# Patient Record
Sex: MALE | Race: WHITE | NOT HISPANIC OR LATINO | Employment: FULL TIME | URBAN - METROPOLITAN AREA
[De-identification: names, ages, dates, MRNs, and addresses within clinical notes are randomized per-mention and may not be internally consistent; named-entity substitution may affect disease eponyms.]

---

## 2017-11-15 ENCOUNTER — TELEPHONE (OUTPATIENT)
Dept: FAMILY MEDICINE CLINIC | Facility: CLINIC | Age: 47
End: 2017-11-15

## 2017-11-15 RX ORDER — SILDENAFIL 100 MG/1
100 TABLET, FILM COATED ORAL DAILY PRN
Qty: 10 TABLET | Refills: 3 | Status: SHIPPED | OUTPATIENT
Start: 2017-11-15 | End: 2019-11-08

## 2017-11-15 NOTE — TELEPHONE ENCOUNTER
PT WOULD LIKE AN RX FOR VIAGRA CALLED IN TO HIS PHARMACY HE WAS GETTING SAMPLES     W\[facility]

## 2017-11-15 NOTE — TELEPHONE ENCOUNTER
Dr matamoros saw this pt for ED and gave him samples but it has been over a year, its in the abstraction. bti did not give samples or see pt for this

## 2018-01-11 ENCOUNTER — OFFICE VISIT (OUTPATIENT)
Dept: FAMILY MEDICINE CLINIC | Facility: CLINIC | Age: 48
End: 2018-01-11

## 2018-01-11 VITALS
SYSTOLIC BLOOD PRESSURE: 106 MMHG | RESPIRATION RATE: 16 BRPM | BODY MASS INDEX: 23.05 KG/M2 | HEART RATE: 97 BPM | WEIGHT: 161 LBS | DIASTOLIC BLOOD PRESSURE: 62 MMHG | OXYGEN SATURATION: 96 % | TEMPERATURE: 98.9 F | HEIGHT: 70 IN

## 2018-01-11 DIAGNOSIS — R68.89 FLU-LIKE SYMPTOMS: Primary | ICD-10-CM

## 2018-01-11 LAB
FLUAV AG NPH QL: NEGATIVE
FLUBV AG NPH QL IA: NEGATIVE

## 2018-01-11 PROCEDURE — 87804 INFLUENZA ASSAY W/OPTIC: CPT | Performed by: INTERNAL MEDICINE

## 2018-01-11 PROCEDURE — 99213 OFFICE O/P EST LOW 20 MIN: CPT | Performed by: INTERNAL MEDICINE

## 2018-01-11 RX ORDER — AMOXICILLIN 875 MG/1
875 TABLET, COATED ORAL EVERY 12 HOURS SCHEDULED
Qty: 20 TABLET | Refills: 0 | Status: SHIPPED | OUTPATIENT
Start: 2018-01-11 | End: 2019-11-08

## 2018-01-11 RX ORDER — OSELTAMIVIR PHOSPHATE 75 MG/1
75 CAPSULE ORAL DAILY
Qty: 10 CAPSULE | Refills: 0 | Status: SHIPPED | OUTPATIENT
Start: 2018-01-11 | End: 2019-11-08

## 2018-01-11 NOTE — PROGRESS NOTES
Subjective   Jeferson Douglas is a 47 y.o. male.     History of Present Illness   Patient was seen today for flulike symptoms.  Flu test was negative.  Patient was in the hospital LDL in today with his wife and was exposed to the flu.  Patient was given Tamiflu and Amoxil and was asked to take the Tamiflu day for 10 days.  He is to start the amoxicillin is running fevers and has a productive cough.    Much of this encounter note is an electronic transcription/translation of spoken language to printed text.  The electronic translation of spoken language may permit erroneous, or at times, nonsensical words or phrases to be inadvertently transcribed.  Although I have reviewed the note for such errors, some may still exist.  The following portions of the patient's history were reviewed and updated as appropriate: allergies, current medications, past family history, past medical history, past social history, past surgical history and problem list.    Review of Systems   Constitutional: Negative for fatigue and fever.   HENT: Positive for congestion, rhinorrhea and sinus pressure. Negative for trouble swallowing.    Eyes: Negative for discharge and visual disturbance.   Respiratory: Negative for choking and shortness of breath.    Cardiovascular: Negative for chest pain and palpitations.   Gastrointestinal: Negative for abdominal pain and blood in stool.   Endocrine: Negative.    Genitourinary: Negative for genital sores and hematuria.   Musculoskeletal: Negative for gait problem and joint swelling.   Skin: Negative for color change, pallor, rash and wound.   Allergic/Immunologic: Positive for environmental allergies. Negative for immunocompromised state.   Neurological: Negative for facial asymmetry and speech difficulty.   Psychiatric/Behavioral: Negative for hallucinations and suicidal ideas.       Objective   Physical Exam   Constitutional: He is oriented to person, place, and time. He appears well-developed and  well-nourished.   HENT:   Head: Normocephalic.   Eyes: Conjunctivae are normal. Pupils are equal, round, and reactive to light.   Neck: Normal range of motion. Neck supple.   Cardiovascular: Normal rate, regular rhythm and normal heart sounds.    Pulmonary/Chest: Effort normal and breath sounds normal. No respiratory distress. He has no wheezes. He has no rales. He exhibits no tenderness.   Abdominal: Soft. Bowel sounds are normal.   Musculoskeletal: Normal range of motion.   Neurological: He is alert and oriented to person, place, and time.   Skin: Skin is warm and dry.   Psychiatric: He has a normal mood and affect. His behavior is normal. Judgment and thought content normal.   Nursing note and vitals reviewed.      Assessment/Plan   Problems Addressed this Visit     None      Visit Diagnoses     Flu-like symptoms    -  Primary    Relevant Orders    Influenza Antigen, Rapid - Swab, Nasopharynx (Completed)

## 2019-11-08 ENCOUNTER — OFFICE VISIT (OUTPATIENT)
Dept: FAMILY MEDICINE CLINIC | Facility: CLINIC | Age: 49
End: 2019-11-08

## 2019-11-08 VITALS
SYSTOLIC BLOOD PRESSURE: 146 MMHG | WEIGHT: 158.2 LBS | HEART RATE: 80 BPM | DIASTOLIC BLOOD PRESSURE: 84 MMHG | TEMPERATURE: 98 F | OXYGEN SATURATION: 99 % | BODY MASS INDEX: 22.65 KG/M2 | HEIGHT: 70 IN

## 2019-11-08 DIAGNOSIS — Z13.220 LIPID SCREENING: ICD-10-CM

## 2019-11-08 DIAGNOSIS — K42.9 UMBILICAL HERNIA WITHOUT OBSTRUCTION AND WITHOUT GANGRENE: ICD-10-CM

## 2019-11-08 DIAGNOSIS — N40.0 ENLARGED PROSTATE: ICD-10-CM

## 2019-11-08 DIAGNOSIS — Z12.5 ENCOUNTER FOR PROSTATE CANCER SCREENING: ICD-10-CM

## 2019-11-08 DIAGNOSIS — Z12.11 ENCOUNTER FOR COLORECTAL CANCER SCREENING: ICD-10-CM

## 2019-11-08 DIAGNOSIS — Z12.12 ENCOUNTER FOR COLORECTAL CANCER SCREENING: ICD-10-CM

## 2019-11-08 DIAGNOSIS — Z00.01 ENCOUNTER FOR ANNUAL GENERAL MEDICAL EXAMINATION WITH ABNORMAL FINDINGS IN ADULT: Primary | ICD-10-CM

## 2019-11-08 DIAGNOSIS — L98.9 SKIN LESION OF FACE: ICD-10-CM

## 2019-11-08 LAB
ALBUMIN SERPL-MCNC: 4.2 G/DL (ref 3.5–5.2)
ALBUMIN/GLOB SERPL: 1.5 G/DL
ALP SERPL-CCNC: 47 U/L (ref 39–117)
ALT SERPL W P-5'-P-CCNC: 22 U/L (ref 1–41)
ANION GAP SERPL CALCULATED.3IONS-SCNC: 11.5 MMOL/L (ref 5–15)
AST SERPL-CCNC: 22 U/L (ref 1–40)
BACTERIA UR QL AUTO: NORMAL /HPF
BILIRUB SERPL-MCNC: 1 MG/DL (ref 0.2–1.2)
BILIRUB UR QL STRIP: NEGATIVE
BUN BLD-MCNC: 17 MG/DL (ref 6–20)
BUN/CREAT SERPL: 16.3 (ref 7–25)
CALCIUM SPEC-SCNC: 8.9 MG/DL (ref 8.6–10.5)
CHLORIDE SERPL-SCNC: 101 MMOL/L (ref 98–107)
CHOLEST SERPL-MCNC: 190 MG/DL (ref 0–200)
CLARITY UR: CLEAR
CO2 SERPL-SCNC: 26.5 MMOL/L (ref 22–29)
COLOR UR: YELLOW
CREAT BLD-MCNC: 1.04 MG/DL (ref 0.76–1.27)
ERYTHROCYTE [DISTWIDTH] IN BLOOD BY AUTOMATED COUNT: 13.4 % (ref 12.3–15.4)
GFR SERPL CREATININE-BSD FRML MDRD: 76 ML/MIN/1.73
GLOBULIN UR ELPH-MCNC: 2.8 GM/DL
GLUCOSE BLD-MCNC: 113 MG/DL (ref 65–99)
GLUCOSE UR STRIP-MCNC: NEGATIVE MG/DL
HCT VFR BLD AUTO: 46.5 % (ref 37.5–51)
HDLC SERPL-MCNC: 87 MG/DL (ref 40–60)
HGB BLD-MCNC: 15.5 G/DL (ref 13–17.7)
HGB UR QL STRIP.AUTO: NEGATIVE
KETONES UR QL STRIP: ABNORMAL
LDLC SERPL CALC-MCNC: 83 MG/DL (ref 0–100)
LDLC/HDLC SERPL: 0.96 {RATIO}
LEUKOCYTE ESTERASE UR QL STRIP.AUTO: NEGATIVE
LYMPHOCYTES # BLD AUTO: 1.8 10*3/MM3 (ref 0.7–3.1)
LYMPHOCYTES NFR BLD AUTO: 32.3 % (ref 19.6–45.3)
MCH RBC QN AUTO: 30.9 PG (ref 26.6–33)
MCHC RBC AUTO-ENTMCNC: 33.4 G/DL (ref 31.5–35.7)
MCV RBC AUTO: 92.6 FL (ref 79–97)
MONOCYTES # BLD AUTO: 0.4 10*3/MM3 (ref 0.1–0.9)
MONOCYTES NFR BLD AUTO: 6.7 % (ref 5–12)
NEUTROPHILS # BLD AUTO: 3.5 10*3/MM3 (ref 1.7–7)
NEUTROPHILS NFR BLD AUTO: 61 % (ref 42.7–76)
NITRITE UR QL STRIP: NEGATIVE
PH UR STRIP.AUTO: 5.5 [PH] (ref 4.6–8)
PLATELET # BLD AUTO: 316 10*3/MM3 (ref 140–450)
PMV BLD AUTO: 7.4 FL (ref 6–12)
POTASSIUM BLD-SCNC: 4.2 MMOL/L (ref 3.5–5.2)
PROT SERPL-MCNC: 7 G/DL (ref 6–8.5)
PROT UR QL STRIP: NEGATIVE
PSA SERPL-MCNC: 1.2 NG/ML (ref 0–4)
RBC # BLD AUTO: 5.02 10*6/MM3 (ref 4.14–5.8)
RBC # UR: NORMAL /HPF
REF LAB TEST METHOD: NORMAL
SODIUM BLD-SCNC: 139 MMOL/L (ref 136–145)
SP GR UR STRIP: 1.02 (ref 1–1.03)
SQUAMOUS #/AREA URNS HPF: NORMAL /HPF
TRIGL SERPL-MCNC: 99 MG/DL (ref 0–150)
UROBILINOGEN UR QL STRIP: ABNORMAL
VLDLC SERPL-MCNC: 19.8 MG/DL (ref 5–40)
WBC NRBC COR # BLD: 5.7 10*3/MM3 (ref 3.4–10.8)
WBC UR QL AUTO: NORMAL /HPF

## 2019-11-08 PROCEDURE — 99396 PREV VISIT EST AGE 40-64: CPT | Performed by: INTERNAL MEDICINE

## 2019-11-08 PROCEDURE — 80061 LIPID PANEL: CPT | Performed by: INTERNAL MEDICINE

## 2019-11-08 PROCEDURE — 81001 URINALYSIS AUTO W/SCOPE: CPT | Performed by: INTERNAL MEDICINE

## 2019-11-08 PROCEDURE — 80053 COMPREHEN METABOLIC PANEL: CPT | Performed by: INTERNAL MEDICINE

## 2019-11-08 PROCEDURE — 85025 COMPLETE CBC W/AUTO DIFF WBC: CPT | Performed by: INTERNAL MEDICINE

## 2019-11-08 PROCEDURE — 99213 OFFICE O/P EST LOW 20 MIN: CPT | Performed by: INTERNAL MEDICINE

## 2019-11-08 PROCEDURE — G0103 PSA SCREENING: HCPCS | Performed by: INTERNAL MEDICINE

## 2019-11-08 NOTE — PROGRESS NOTES
Subjective   Jeferson Douglas is a 49 y.o. male.  Patient with encounter for annual adult general medical evaluation no particular problems with patient does have known umbilical hernia which is not given much trouble she does start prostate screening  Body mass index is 22.7 kg/m².  History of Present Illness   As above annual adult general medical evaluation does have known small umbilical hernia which does not give him problems would like that checked send some discoloration below his right eye which he wishes to have looked at needs prostate screening starting since he just turned 49 possible need to do colorectal screening he will think about that but probably will get it done this year.  Let us know no blood pressure issues did answer some questions about ED medication costs mainly asked that he check with wishes for his low risk because he had been on Viagra in the past.  Patient without any prostate cancer in the family history no contributory family history.  Former smoker quit in 2013 no drug allergies  Review of Systems   All other systems reviewed and are negative.      Objective   Vitals:    11/08/19 0829   BP: 130/86   Pulse: 80   Temp: 98 °F (36.7 °C)   SpO2: 99%   Weight: 71.8 kg (158 lb 3.2 oz)     Physical Exam   Constitutional: He appears well-developed and well-nourished.   HENT:   Head: Normocephalic and atraumatic.   Eyes: Conjunctivae are normal. Pupils are equal, round, and reactive to light.   Neck:   No carotid bruits   Cardiovascular: Normal rate, regular rhythm and normal heart sounds.   Pulmonary/Chest: Effort normal and breath sounds normal.   Abdominal: Soft. Bowel sounds are normal.   Smallish umbilical hernia right at [employer].  Not perceive any incarceration risk.  Does not bulge soft no pain or masses noted.  Currently observe or see surgeon for this.   do not sense any urgency regarding possible surgical repair   Genitourinary:   Genitourinary Comments: Penis within normal limits  scrotum is without masses.  Mild atrophic left testis but had a undescended left testis require surgery as a small child.  Did do digital rectal exam which shows prostate to be 3-4+ with a nodular prominence left prostate lobe we are going to refer him to urology for this.  He will check with family soon family issues and get back with us early next week.   Neurological: He is alert.   Skin: Skin is warm and dry.       No results found for: INR    Procedures    Assessment/Plan   1.  Encounter for annual adult general medical evaluation    2.  Encounter for prostate screening weight pending lab    3.  Enlarged prostate urology referral    4.  Umbilical hernia small observation now patient can see general surgery feels the need for surgeon having pain.    5.  Lipid screening    6.  Skin lesion below right eye increased pigmentation some dermatologist of choice we did give a list of dermatologist for him.    Encounter for colorectal screening patient tends to get it done this year and will get back with us.      Much of this encounter note is an electronic transcription/translation of spoken language to printed text.  The electronic translation of spoken language may permit erroneous, or at times, nonsensical words or phrases to be inadvertently transcribed.  Although I have reviewed the note for such errors, some may still exist. If there are questions or for further clarification, please contact me.

## 2019-11-12 ENCOUNTER — TELEPHONE (OUTPATIENT)
Dept: FAMILY MEDICINE CLINIC | Facility: CLINIC | Age: 49
End: 2019-11-12

## 2019-11-12 NOTE — TELEPHONE ENCOUNTER
Confirm patient has been on this in the past.  If so try to get the generic Viagra 50 mg presuming is really want to improve.  Actually should go through is on milligrams with a half-whole tablet daily as needed please ascertain he is willing to accept the generic and not the brand name version Viagra..  Make sure he going to follow-up with urology also

## 2019-11-12 NOTE — TELEPHONE ENCOUNTER
Patient informed we have not sent a rx over for this medication to get a PA.  So he wants it sent over for 50mg Viagra.h

## 2019-11-13 DIAGNOSIS — N52.9 ERECTILE DYSFUNCTION, UNSPECIFIED ERECTILE DYSFUNCTION TYPE: Primary | ICD-10-CM

## 2019-11-13 RX ORDER — SILDENAFIL 50 MG/1
50 TABLET, FILM COATED ORAL DAILY PRN
Qty: 6 TABLET | Refills: 5 | Status: SHIPPED | OUTPATIENT
Start: 2019-11-13 | End: 2019-11-26 | Stop reason: SDUPTHER

## 2019-11-13 NOTE — TELEPHONE ENCOUNTER
LMVM in regards to message and appt needed for Urology for ED.  Sent in Sildenafil to pharmacy.[facility]

## 2019-11-19 ENCOUNTER — TELEPHONE (OUTPATIENT)
Dept: FAMILY MEDICINE CLINIC | Facility: CLINIC | Age: 49
End: 2019-11-19

## 2019-11-26 ENCOUNTER — TELEPHONE (OUTPATIENT)
Dept: FAMILY MEDICINE CLINIC | Facility: CLINIC | Age: 49
End: 2019-11-26

## 2019-11-26 RX ORDER — SILDENAFIL 50 MG/1
50 TABLET, FILM COATED ORAL DAILY PRN
Qty: 6 TABLET | Refills: 5 | Status: SHIPPED | OUTPATIENT
Start: 2019-11-26 | End: 2019-11-26

## 2019-11-26 RX ORDER — SILDENAFIL 50 MG/1
50 TABLET, FILM COATED ORAL DAILY PRN
Qty: 30 TABLET | Refills: 3 | Status: SHIPPED | OUTPATIENT
Start: 2019-11-26 | End: 2019-12-02 | Stop reason: SDUPTHER

## 2019-11-26 NOTE — TELEPHONE ENCOUNTER
PT RECEIVED CALL FROM [facility] ABOUT HIS RX, BUT HE DIDN'T REALIZE THE QUANTITY WAS ONLY 6    ASKED IF DR. CHAHAL COULD CHANGE THE QUANTITY TO 30 AND PRINT THE RX SO HE COULD PICK IT UP AND TAKE IT TO [facility]?    EITHER WAY, HE WANTS TO SPEAK WITH SOMEONE WHEN DR HIDALGOS

## 2019-11-26 NOTE — TELEPHONE ENCOUNTER
PT NEEDS THIS RX CANCELED AT [facility] AND SENT TO THE [facility] IN HIS CHART PLEASE  sildenafil (VIAGRA) 50 MG tablet

## 2019-12-02 RX ORDER — SILDENAFIL 50 MG/1
50 TABLET, FILM COATED ORAL DAILY PRN
Qty: 30 TABLET | Refills: 3 | Status: SHIPPED | OUTPATIENT
Start: 2019-12-02 | End: 2022-05-27 | Stop reason: SDUPTHER

## 2022-05-27 ENCOUNTER — OFFICE VISIT (OUTPATIENT)
Dept: FAMILY MEDICINE CLINIC | Facility: CLINIC | Age: 52
End: 2022-05-27

## 2022-05-27 VITALS
BODY MASS INDEX: 23.77 KG/M2 | WEIGHT: 166 LBS | HEIGHT: 70 IN | HEART RATE: 88 BPM | TEMPERATURE: 98 F | SYSTOLIC BLOOD PRESSURE: 148 MMHG | OXYGEN SATURATION: 99 % | RESPIRATION RATE: 18 BRPM | DIASTOLIC BLOOD PRESSURE: 72 MMHG

## 2022-05-27 DIAGNOSIS — R03.0 ELEVATED BP WITHOUT DIAGNOSIS OF HYPERTENSION: ICD-10-CM

## 2022-05-27 DIAGNOSIS — N52.9 ERECTILE DYSFUNCTION, UNSPECIFIED ERECTILE DYSFUNCTION TYPE: ICD-10-CM

## 2022-05-27 DIAGNOSIS — M54.2 NECK PAIN: Primary | ICD-10-CM

## 2022-05-27 DIAGNOSIS — R42 VERTIGO: ICD-10-CM

## 2022-05-27 PROBLEM — M51.369 BULGING LUMBAR DISC: Status: ACTIVE | Noted: 2022-05-27

## 2022-05-27 PROBLEM — N52.8 OTHER MALE ERECTILE DYSFUNCTION: Status: ACTIVE | Noted: 2022-05-27

## 2022-05-27 PROBLEM — M51.36 BULGING LUMBAR DISC: Status: ACTIVE | Noted: 2022-05-27

## 2022-05-27 PROBLEM — K42.9 UMBILICAL HERNIA WITHOUT OBSTRUCTION AND WITHOUT GANGRENE: Status: ACTIVE | Noted: 2022-05-27

## 2022-05-27 LAB
ALBUMIN SERPL-MCNC: 4.7 G/DL (ref 3.5–5.2)
ALBUMIN/GLOB SERPL: 2.2 G/DL
ALP SERPL-CCNC: 47 U/L (ref 39–117)
ALT SERPL W P-5'-P-CCNC: 19 U/L (ref 1–41)
ANION GAP SERPL CALCULATED.3IONS-SCNC: 9 MMOL/L (ref 5–15)
AST SERPL-CCNC: 18 U/L (ref 1–40)
BILIRUB SERPL-MCNC: 0.7 MG/DL (ref 0–1.2)
BUN SERPL-MCNC: 12 MG/DL (ref 6–20)
BUN/CREAT SERPL: 12.6 (ref 7–25)
CALCIUM SPEC-SCNC: 9.2 MG/DL (ref 8.6–10.5)
CHLORIDE SERPL-SCNC: 103 MMOL/L (ref 98–107)
CHOLEST SERPL-MCNC: 243 MG/DL (ref 0–200)
CO2 SERPL-SCNC: 27 MMOL/L (ref 22–29)
CREAT SERPL-MCNC: 0.95 MG/DL (ref 0.76–1.27)
EGFRCR SERPLBLD CKD-EPI 2021: 96.3 ML/MIN/1.73
ERYTHROCYTE [DISTWIDTH] IN BLOOD BY AUTOMATED COUNT: 13.4 % (ref 12.3–15.4)
GLOBULIN UR ELPH-MCNC: 2.1 GM/DL
GLUCOSE SERPL-MCNC: 115 MG/DL (ref 65–99)
HCT VFR BLD AUTO: 47.3 % (ref 37.5–51)
HDLC SERPL-MCNC: 74 MG/DL (ref 40–60)
HGB BLD-MCNC: 15.6 G/DL (ref 13–17.7)
LDLC SERPL CALC-MCNC: 157 MG/DL (ref 0–100)
LDLC/HDLC SERPL: 2.08 {RATIO}
LYMPHOCYTES # BLD AUTO: 1.4 10*3/MM3 (ref 0.7–3.1)
LYMPHOCYTES NFR BLD AUTO: 28.9 % (ref 19.6–45.3)
MCH RBC QN AUTO: 30.7 PG (ref 26.6–33)
MCHC RBC AUTO-ENTMCNC: 33 G/DL (ref 31.5–35.7)
MCV RBC AUTO: 92.8 FL (ref 79–97)
MONOCYTES # BLD AUTO: 0.4 10*3/MM3 (ref 0.1–0.9)
MONOCYTES NFR BLD AUTO: 8.8 % (ref 5–12)
NEUTROPHILS NFR BLD AUTO: 3 10*3/MM3 (ref 1.7–7)
NEUTROPHILS NFR BLD AUTO: 62.3 % (ref 42.7–76)
PLATELET # BLD AUTO: 324 10*3/MM3 (ref 140–450)
PMV BLD AUTO: 6.9 FL (ref 6–12)
POTASSIUM SERPL-SCNC: 4.7 MMOL/L (ref 3.5–5.2)
PROT SERPL-MCNC: 6.8 G/DL (ref 6–8.5)
RBC # BLD AUTO: 5.09 10*6/MM3 (ref 4.14–5.8)
SODIUM SERPL-SCNC: 139 MMOL/L (ref 136–145)
TRIGL SERPL-MCNC: 74 MG/DL (ref 0–150)
TSH SERPL DL<=0.05 MIU/L-ACNC: 2.54 UIU/ML (ref 0.27–4.2)
VLDLC SERPL-MCNC: 12 MG/DL (ref 5–40)
WBC NRBC COR # BLD: 4.8 10*3/MM3 (ref 3.4–10.8)

## 2022-05-27 PROCEDURE — 80061 LIPID PANEL: CPT

## 2022-05-27 PROCEDURE — 36415 COLL VENOUS BLD VENIPUNCTURE: CPT

## 2022-05-27 PROCEDURE — 80050 GENERAL HEALTH PANEL: CPT

## 2022-05-27 PROCEDURE — 99214 OFFICE O/P EST MOD 30 MIN: CPT

## 2022-05-27 RX ORDER — SILDENAFIL 50 MG/1
50 TABLET, FILM COATED ORAL DAILY PRN
Qty: 30 TABLET | Refills: 0 | Status: SHIPPED | OUTPATIENT
Start: 2022-05-27

## 2022-05-27 RX ORDER — MELOXICAM 7.5 MG/1
7.5 TABLET ORAL DAILY
Qty: 30 TABLET | Refills: 0 | Status: SHIPPED | OUTPATIENT
Start: 2022-05-27 | End: 2022-06-24

## 2022-05-27 RX ORDER — SILDENAFIL 50 MG/1
50 TABLET, FILM COATED ORAL DAILY PRN
Qty: 30 TABLET | Refills: 0 | Status: SHIPPED | OUTPATIENT
Start: 2022-05-27 | End: 2022-05-27 | Stop reason: SDUPTHER

## 2022-05-27 RX ORDER — BACLOFEN 10 MG/1
10 TABLET ORAL 3 TIMES DAILY
Qty: 30 TABLET | Refills: 0 | Status: SHIPPED | OUTPATIENT
Start: 2022-05-27 | End: 2022-12-02

## 2022-05-27 NOTE — PATIENT INSTRUCTIONS
Our office will call you or you will see a note on your [employer] alfred when your in-office blood work results.  You have been given a prescription for blood pressure cuff today.  Please check blood pressure daily and follow-up with office in 1 week for blood pressure readings.   For neck pain, use heat pack on neck 2-3 times a day for 20-minute intervals.  For the next 3 to 5 days, take meloxicam daily for inflammation and pain.  May take baclofen as needed for muscle spasms 3 times a day; do not take while driving or drinking alcohol.  If not better in about 1 to 2 weeks, you can return for x-rays and we can order physical therapy.  Patient agrees with plan of care and understands instructions. Call if worsening symptoms or any problems or concerns.

## 2022-05-27 NOTE — PROGRESS NOTES
"Chief Complaint  Neck Pain (About a month no known injury) and dizzy episode (Was driving and had dizzy spell and felt bad/ )    Subjective          Jeferson Douglas presents to [facility]  History of Present Illness  This patient is a 51-year-old male, new patient to me, last seen in office 11/8/2019.    Pt reports neck pain for about 1-2 months, nki. States it feels like a strained muscle. Denies N/T in shoulders or arms. Feels pain with pinching pain when moving head up and down. Pt reports its a constant pain that is worse with movement. Denies injury in neck in past. History of lower back issues, bulging disc in lumbar area- relieved with PT and meds. Denies trying ice or heat. States he tried IBU OTC medication for neck pain, with mild temporary relief. States pain goes away when he is sleeping and laying down.    Pt has had a history of room-spinning dizziness and had a history of vertigo. Pt reports he feels like when he is walking, he feels a sensation of unsteadiness/ \"feeling like skipping\". Pt not interested in starting medication today for vertigo.    Pt reports he was seen by a GI doctor for his hiatal hernia in past several years ago.  Patient states he was told to not worry about the hernia unless it causes him issues.  Patient would like it checked out today patient denies abdominal pain and denies abnormal discharge from bellybutton.    Patient has been prescribed to Viagra in the past and has requested a refill today.  Patient in past has been on Viagra 50 mg tablet daily as needed for erectile dysfunction, patient has tolerated this dose in the past.    Objective   Vital Signs:  /72 (BP Location: Right arm, Patient Position: Sitting)   Pulse 88   Temp 98 °F (36.7 °C) (Infrared)   Resp 18   Ht 177.8 cm (70\")   Wt 75.3 kg (166 lb)   SpO2 99%   BMI 23.82 kg/m²   BMI is within normal parameters. No other follow-up for BMI required.      Physical " Exam  Constitutional:       General: He is awake.      Appearance: Normal appearance.   HENT:      Head: Normocephalic and atraumatic.      Nose: Nose normal.   Eyes:      Extraocular Movements: Extraocular movements intact.      Conjunctiva/sclera: Conjunctivae normal.      Pupils: Pupils are equal, round, and reactive to light.   Cardiovascular:      Rate and Rhythm: Normal rate and regular rhythm.      Pulses: Normal pulses.      Heart sounds: Normal heart sounds.   Pulmonary:      Effort: Pulmonary effort is normal.      Breath sounds: Normal breath sounds and air entry.   Musculoskeletal:      Cervical back: Full passive range of motion without pain, normal range of motion and neck supple.   Skin:     General: Skin is warm and dry.   Neurological:      General: No focal deficit present.      Mental Status: He is alert and oriented to person, place, and time. Mental status is at baseline.   Psychiatric:         Attention and Perception: Attention normal.         Behavior: Behavior normal. Behavior is cooperative.        Result Review :                 Assessment and Plan    Diagnoses and all orders for this visit:    1. Neck pain (Primary)  -     CBC & Differential  -     Comprehensive Metabolic Panel  -     Lipid Panel  -     TSH Rfx On Abnormal To Free T4  -     meloxicam (Mobic) 7.5 MG tablet; Take 1 tablet by mouth Daily.  Dispense: 30 tablet; Refill: 0  -     baclofen (LIORESAL) 10 MG tablet; Take 1 tablet by mouth 3 (Three) Times a Day.  Dispense: 30 tablet; Refill: 0    2. Vertigo  -     CBC & Differential  -     Comprehensive Metabolic Panel  -     Lipid Panel  -     TSH Rfx On Abnormal To Free T4    3. Elevated BP without diagnosis of hypertension  -     Miscellaneous DME    4. Erectile dysfunction, unspecified erectile dysfunction type  -     Discontinue: sildenafil (VIAGRA) 50 MG tablet; Take 1 tablet by mouth Daily As Needed for Erectile Dysfunction.  Dispense: 30 tablet; Refill: 0  -     sildenafil  (VIAGRA) 50 MG tablet; Take 1 tablet by mouth Daily As Needed for Erectile Dysfunction.  Dispense: 30 tablet; Refill: 0    Our office will call you or you will see a note on your [employer] alfred when your in-office blood work results.  You have been given a prescription for blood pressure cuff today.  Please check blood pressure daily and follow-up with office in 1 week for blood pressure readings.   For neck pain, use heat pack on neck 2-3 times a day for 20-minute intervals.  For the next 3 to 5 days, take meloxicam daily for inflammation and pain.  May take baclofen as needed for muscle spasms 3 times a day; do not take while driving or drinking alcohol.  If not better in about 1 to 2 weeks, you can return for x-rays and we can order physical therapy.  Patient agrees with plan of care and understands instructions. Call if worsening symptoms or any problems or concerns.            Follow Up   Return in about 1 week (around 6/3/2022) for recheck bp in 1 week and return for 6 month annual physical.  Patient was given instructions and counseling regarding his condition or for health maintenance advice. Please see specific information pulled into the AVS if appropriate.

## 2022-06-24 DIAGNOSIS — M54.2 NECK PAIN: ICD-10-CM

## 2022-06-24 RX ORDER — MELOXICAM 7.5 MG/1
TABLET ORAL
Qty: 30 TABLET | Refills: 0 | Status: SHIPPED | OUTPATIENT
Start: 2022-06-24 | End: 2022-12-02

## 2022-12-02 ENCOUNTER — OFFICE VISIT (OUTPATIENT)
Dept: FAMILY MEDICINE CLINIC | Facility: CLINIC | Age: 52
End: 2022-12-02

## 2022-12-02 VITALS
HEIGHT: 70 IN | HEART RATE: 80 BPM | WEIGHT: 169 LBS | DIASTOLIC BLOOD PRESSURE: 78 MMHG | BODY MASS INDEX: 24.2 KG/M2 | TEMPERATURE: 98.2 F | RESPIRATION RATE: 18 BRPM | OXYGEN SATURATION: 99 % | SYSTOLIC BLOOD PRESSURE: 142 MMHG

## 2022-12-02 DIAGNOSIS — H93.8X3 SENSATION OF FULLNESS IN BOTH EARS: ICD-10-CM

## 2022-12-02 DIAGNOSIS — Z11.59 ENCOUNTER FOR HEPATITIS C SCREENING TEST FOR LOW RISK PATIENT: ICD-10-CM

## 2022-12-02 DIAGNOSIS — Z12.5 SCREENING FOR PROSTATE CANCER: ICD-10-CM

## 2022-12-02 DIAGNOSIS — E78.2 MIXED HYPERLIPIDEMIA: ICD-10-CM

## 2022-12-02 DIAGNOSIS — Z00.00 ANNUAL PHYSICAL EXAM: Primary | ICD-10-CM

## 2022-12-02 DIAGNOSIS — M50.30 DEGENERATIVE DISC DISEASE, CERVICAL: ICD-10-CM

## 2022-12-02 DIAGNOSIS — Z13.228 SCREENING FOR METABOLIC DISORDER: ICD-10-CM

## 2022-12-02 PROBLEM — M43.13 SPONDYLOLISTHESIS OF CERVICOTHORACIC REGION: Status: ACTIVE | Noted: 2022-12-02

## 2022-12-02 PROCEDURE — 99396 PREV VISIT EST AGE 40-64: CPT

## 2022-12-02 PROCEDURE — 99213 OFFICE O/P EST LOW 20 MIN: CPT

## 2022-12-02 RX ORDER — VIT C/B6/B5/MAGNESIUM/HERB 173 50-5-6-5MG
CAPSULE ORAL
COMMUNITY

## 2022-12-02 NOTE — PATIENT INSTRUCTIONS
You are due for routine colon cancer screening.  Please call office and let us know if you would like to have a routine colonoscopy screening or do Cologuard.    Return to office within the next week for fasting labs.    Continue to work on diet and exercise.  Once labs are collected we will recalculate your ASCVD risk score.  If greater than 7 we will start cholesterol medication to prevent future CVD.    Counseling was provided on nutrition, physical activity, development, and preventative health. Patient verbalizes understanding no additional questions were asked. Patient agrees with plan of care and understands instructions. Call if any problems or concerns.

## 2022-12-02 NOTE — PROGRESS NOTES
Chief Complaint  Annual Exam and Neck Pain (F/u - Went to chiropractor after last visit and was told it was arthritis causing the pain.)    Subjective        Jeferson Douglas presents to [facility]  History of Present Illness  Patient is a 52-year-old male last saw me in office 5/27/2022. Patient here today for annual physical with routine labs. Patient is not fasting today. Reports a mixed diet, eats eggs, chicken, fish. Reports that he does exercise regularly. Patient is working is a  for [employer].  Does go to eye doctor annually- Not up to date.  Does have dental insurance- does go to dentist every 6 months, Up to date.   Patient is a non-smoker, use to smoke 1ppd for about 25 years.  Denies family history of colon cancer, diabetes, or prostate cancer.   Colonoscopy: Patient has never had a colonoscopy.  Patient wants to talk to wife before scheduling colonoscopy and will inform our office what he would like to do within the next week.  Last labs completed 5/27/2022, reveals elevated cholesterol panel.  Patient currently not taking any medication for for mixed hyperlipidemia at this time. Patient states he walks a lot at work, uses stationary bike on weekends.  Patient has history of chronic neck pain, goes to chiropractor.  Patient reports he went to a chiropractor last week and told him he has arthritis in his neck.  Patient had cervical x-ray completed on 9/23/2022 that revealed multilevel cervical degenerative disc disease, greatest at C6 and C7 spondylolisthesis with moderately severe left facet joint degeneration.  Copy of x-ray has been provided at today's visit and scanned into chart.  Patient was flying from [place] and was c/o bilateral ear fullness that took several days for them to pop and has continued decreased hearing. Denies having pain.    Objective   Vital Signs:  /78 (BP Location: Left arm, Patient Position: Sitting)   Pulse 80   Temp 98.2 °F (36.8 °C)  "(Infrared)   Resp 18   Ht 177.8 cm (70\")   Wt 76.7 kg (169 lb)   SpO2 99%   BMI 24.25 kg/m²   Estimated body mass index is 24.25 kg/m² as calculated from the following:    Height as of this encounter: 177.8 cm (70\").    Weight as of this encounter: 76.7 kg (169 lb).    BMI is within normal parameters. No other follow-up for BMI required.    The 10-year ASCVD risk score (Leandro SANCHEZ, et al., 2019) is: 4.9%    Values used to calculate the score:      Age: 52 years      Sex: Male      Is Non- : No      Diabetic: No      Tobacco smoker: No      Systolic Blood Pressure: 142 mmHg      Is BP treated: No      HDL Cholesterol: 74 mg/dL      Total Cholesterol: 243 mg/dL      Physical Exam  Constitutional:       General: He is awake.      Appearance: Normal appearance.   HENT:      Head: Normocephalic and atraumatic.      Right Ear: Tympanic membrane and ear canal normal.      Left Ear: Tympanic membrane and ear canal normal.      Nose: Nose normal.      Right Turbinates: Not swollen or pale.      Left Turbinates: Not swollen or pale.   Eyes:      Extraocular Movements: Extraocular movements intact.      Conjunctiva/sclera: Conjunctivae normal.      Pupils: Pupils are equal, round, and reactive to light.   Cardiovascular:      Rate and Rhythm: Normal rate and regular rhythm.      Pulses: Normal pulses.      Heart sounds: Normal heart sounds.   Pulmonary:      Effort: Pulmonary effort is normal.      Breath sounds: Normal breath sounds and air entry.   Skin:     General: Skin is warm and dry.   Neurological:      General: No focal deficit present.      Mental Status: He is alert and oriented to person, place, and time. Mental status is at baseline.   Psychiatric:         Attention and Perception: Attention normal.         Behavior: Behavior normal. Behavior is cooperative.        Result Review :                Assessment and Plan   Diagnoses and all orders for this visit:    1. Annual physical exam " (Primary)  -     CBC & Differential; Future  -     Comprehensive Metabolic Panel; Future  -     Lipid Panel; Future  -     PSA Screen; Future  -     HCV Antibody Rfx To Qnt PCR; Future    2. Mixed hyperlipidemia  -     Comprehensive Metabolic Panel; Future  -     Lipid Panel; Future    3. Screening for metabolic disorder  -     CBC & Differential; Future  -     Comprehensive Metabolic Panel; Future    4. Encounter for hepatitis C screening test for low risk patient  -     HCV Antibody Rfx To Qnt PCR; Future    5. Screening for prostate cancer  -     PSA Screen; Future    6. Degenerative disc disease, cervical    7. Sensation of fullness in both ears    You are due for routine colon cancer screening.  Please call office and let us know if you would like to have a routine colonoscopy screening or do Cologuard.    Return to office within the next week for fasting labs.    Continue to work on diet and exercise.  Once labs are collected we will recalculate your ASCVD risk score.  If greater than 7 we will start cholesterol medication to prevent future CVD.    Counseling was provided on nutrition, physical activity, development, and preventative health. Patient verbalizes understanding no additional questions were asked. Patient agrees with plan of care and understands instructions. Call if any problems or concerns.            Follow Up   Return for lab only apt for fasting labs.  Patient was given instructions and counseling regarding his condition or for health maintenance advice. Please see specific information pulled into the AVS if appropriate.

## 2022-12-09 ENCOUNTER — TELEPHONE (OUTPATIENT)
Dept: FAMILY MEDICINE CLINIC | Facility: CLINIC | Age: 52
End: 2022-12-09

## 2022-12-09 DIAGNOSIS — Z12.11 SCREEN FOR COLON CANCER: Primary | ICD-10-CM

## 2022-12-09 NOTE — TELEPHONE ENCOUNTER
Caller: Jeferson Douglas    Relationship: Self    Best call back number: [phone]    What specialty or service is being requested: PREVENTATIVE COLONOSCOPY PROCEDURE    What is the provider, practice or medical service name: [facility]

## 2023-01-26 ENCOUNTER — PREP FOR SURGERY (OUTPATIENT)
Dept: OTHER | Facility: HOSPITAL | Age: 53
End: 2023-01-26
Payer: COMMERCIAL

## 2023-01-26 DIAGNOSIS — Z12.11 SCREEN FOR COLON CANCER: Primary | ICD-10-CM

## 2023-02-08 PROBLEM — Z12.11 SCREEN FOR COLON CANCER: Status: ACTIVE | Noted: 2023-02-08

## 2023-03-24 ENCOUNTER — ANESTHESIA (OUTPATIENT)
Dept: GASTROENTEROLOGY | Facility: HOSPITAL | Age: 53
End: 2023-03-24
Payer: COMMERCIAL

## 2023-03-24 ENCOUNTER — HOSPITAL ENCOUNTER (OUTPATIENT)
Facility: HOSPITAL | Age: 53
Setting detail: HOSPITAL OUTPATIENT SURGERY
Discharge: HOME | End: 2023-03-24
Attending: SURGERY | Admitting: SURGERY
Payer: COMMERCIAL

## 2023-03-24 ENCOUNTER — ANESTHESIA EVENT (OUTPATIENT)
Dept: GASTROENTEROLOGY | Facility: HOSPITAL | Age: 53
End: 2023-03-24
Payer: COMMERCIAL

## 2023-03-24 VITALS
RESPIRATION RATE: 16 BRPM | WEIGHT: 163 LBS | OXYGEN SATURATION: 97 % | DIASTOLIC BLOOD PRESSURE: 96 MMHG | BODY MASS INDEX: 24.14 KG/M2 | HEART RATE: 68 BPM | SYSTOLIC BLOOD PRESSURE: 125 MMHG | HEIGHT: 69 IN

## 2023-03-24 DIAGNOSIS — Z12.11 SCREEN FOR COLON CANCER: ICD-10-CM

## 2023-03-24 PROCEDURE — S0260 H&P FOR SURGERY: HCPCS | Performed by: SURGERY

## 2023-03-24 PROCEDURE — 45385 COLONOSCOPY W/LESION REMOVAL: CPT | Performed by: SURGERY

## 2023-03-24 PROCEDURE — 25010000002 PROPOFOL 10 MG/ML EMULSION: Performed by: NURSE ANESTHETIST, CERTIFIED REGISTERED

## 2023-03-24 PROCEDURE — 45380 COLONOSCOPY AND BIOPSY: CPT | Performed by: SURGERY

## 2023-03-24 PROCEDURE — 88305 TISSUE EXAM BY PATHOLOGIST: CPT | Performed by: SURGERY

## 2023-03-24 RX ORDER — SODIUM CHLORIDE, SODIUM LACTATE, POTASSIUM CHLORIDE, CALCIUM CHLORIDE 600; 310; 30; 20 MG/100ML; MG/100ML; MG/100ML; MG/100ML
30 INJECTION, SOLUTION INTRAVENOUS CONTINUOUS PRN
Status: DISCONTINUED | OUTPATIENT
Start: 2023-03-24 | End: 2023-03-24 | Stop reason: HOSPADM

## 2023-03-24 RX ORDER — PROPOFOL 10 MG/ML
VIAL (ML) INTRAVENOUS AS NEEDED
Status: DISCONTINUED | OUTPATIENT
Start: 2023-03-24 | End: 2023-03-24 | Stop reason: SURG

## 2023-03-24 RX ORDER — PROPOFOL 10 MG/ML
VIAL (ML) INTRAVENOUS CONTINUOUS PRN
Status: DISCONTINUED | OUTPATIENT
Start: 2023-03-24 | End: 2023-03-24 | Stop reason: SURG

## 2023-03-24 RX ORDER — LIDOCAINE HYDROCHLORIDE 20 MG/ML
INJECTION, SOLUTION INFILTRATION; PERINEURAL AS NEEDED
Status: DISCONTINUED | OUTPATIENT
Start: 2023-03-24 | End: 2023-03-24 | Stop reason: SURG

## 2023-03-24 RX ADMIN — PROPOFOL 180 MCG/KG/MIN: 10 INJECTION, EMULSION INTRAVENOUS at 07:57

## 2023-03-24 RX ADMIN — SODIUM CHLORIDE, POTASSIUM CHLORIDE, SODIUM LACTATE AND CALCIUM CHLORIDE 30 ML/HR: 600; 310; 30; 20 INJECTION, SOLUTION INTRAVENOUS at 07:25

## 2023-03-24 RX ADMIN — LIDOCAINE HYDROCHLORIDE 60 MG: 20 INJECTION, SOLUTION INFILTRATION; PERINEURAL at 07:57

## 2023-03-24 RX ADMIN — Medication 150 MG: at 07:57

## 2023-03-24 NOTE — ANESTHESIA PREPROCEDURE EVALUATION
Anesthesia Evaluation     NPO Solid Status: > 8 hours             Airway   Mallampati: II  TM distance: >3 FB  Neck ROM: full  no difficulty expected  Dental - normal exam     Pulmonary - normal exam   Cardiovascular - normal exam    (+) hyperlipidemia,       Neuro/Psych  (+) dizziness/light headedness,    GI/Hepatic/Renal/Endo      Musculoskeletal     (+) back pain,   Abdominal  - normal exam   Substance History      OB/GYN          Other   arthritis,                      Anesthesia Plan    ASA 2     MAC       Anesthetic plan, risks, benefits, and alternatives have been provided, discussed and informed consent has been obtained with: patient.        CODE STATUS:

## 2023-03-24 NOTE — OP NOTE
Surgeon:     Jacob Mackenzie Jr., M.D.    Preoperative Diagnosis:     1.  Need for screening colonoscopy    Postoperative Diagnosis:     1.  Cecal polyp  2.  Rectal polyp    Procedure Performed:     Colonoscopy with hot snare polypectomy    Indications:     The patient is a pleasant 52-year-old male who presents for screening colonoscopy.  The patient understands the indications, alternatives, risks, and benefits of the procedure and wishes to proceed.    Procedure:     The patient was identified, taken to the endoscopy suite, and place in the left side down decubitus procedure.  The patient underwent a MAC anesthesia and was appropriately monitored through the case by the anesthesia personnel.  A rectal exam was performed that was normal.  The colonoscope was introduced into the rectum and advanced very carefully to the cecum that was identified by the cecal strap, ileocecal valve, and the appendiceal orifice.  The scope was then slowly withdrawn with careful circumferential examination of the mucosa performed.  The bowel prep was good allowing adequate visualization of mucosal surfaces.  The scope was withdrawn.  The patient tolerated the procedure well and was transferred the recovery area in stable condition.    Findings:    There was an elongated sessile 1 cm adenomatous appearing polyp in the cecum that was completely removed by hot snare polypectomy and retrieved.    There was a 4 mm hyperplastic appearing sessile polyp in the rectum that was removed by cold biopsy forceps and retrieved.    Recommendations:     1.  Await pathology results from the polypectomies.  2.  Repeat colonoscopy in 5 years.    Jacob Mackenzie Jr., M.D.

## 2023-03-24 NOTE — DISCHARGE INSTRUCTIONS

## 2023-03-24 NOTE — ANESTHESIA POSTPROCEDURE EVALUATION
"Patient: Jeferson Douglas    Procedure Summary     Date: 03/24/23 Room / Location: [facility] ENDOSCOPY 9 /  [facility]    Anesthesia Start: 0756 Anesthesia Stop: 0821    Procedure: COLONOSCOPY TO CECUM WITH HOT SNARE POLYPECTOMY, COLD POLYPECTOMY Diagnosis:       Screen for colon cancer      (Screen for colon cancer [Z12.11])    Surgeons: Jacob Mackenzie Jr., MD Provider: Chris Velázquez MD    Anesthesia Type: MAC ASA Status: 2          Anesthesia Type: MAC    Vitals  Vitals Value Taken Time   /96 03/24/23 0841   Temp     Pulse 68 03/24/23 0841   Resp 16 03/24/23 0841   SpO2 97 % 03/24/23 0841           Post Anesthesia Care and Evaluation    Patient location during evaluation: bedside  Patient participation: complete - patient participated  Level of consciousness: awake and alert  Pain management: adequate    Airway patency: patent  Anesthetic complications: No anesthetic complications    Cardiovascular status: acceptable  Respiratory status: acceptable  Hydration status: acceptable    Comments: /96 (BP Location: Left arm, Patient Position: Lying)   Pulse 68   Resp 16   Ht 175.3 cm (69\")   Wt 73.9 kg (163 lb)   SpO2 97%   BMI 24.07 kg/m²       "

## 2023-03-24 NOTE — H&P
[facility]   HISTORY AND PHYSICAL    Patient Name: Jeferson Douglas  : 1970  MRN: [id number]  Primary Care Physician:  Jennifer Verdugo APRN  Date of admission: 3/24/2023    Subjective   Subjective     Chief Complaint: Need for screening colonoscopy    History of Present Illness     The patient is a pleasant 52-year-old male who presents for screening colonoscopy.  He has never had a previous colonoscopy.  He has not had any significant GI symptoms.  He has no family history of colon cancer.    Review of Systems   Constitutional: Negative for fatigue and fever.   Respiratory: Negative for chest tightness and shortness of breath.    Cardiovascular: Negative for chest pain and palpitations.   Gastrointestinal: Negative for abdominal pain, blood in stool, constipation, diarrhea, nausea and vomiting.        Personal History     Past Medical History:   Diagnosis Date   • Asthma    • Back pain        Past Surgical History:   Procedure Laterality Date   • HERNIA REPAIR         Family History: family history includes No Known Problems in his mother. Otherwise pertinent FHx was reviewed and not pertinent to current issue.    Social History:  reports that he quit smoking about 10 years ago. His smoking use included cigarettes. He does not have any smokeless tobacco history on file. He reports current alcohol use. He reports that he does not use drugs.    Home Medications:  Turmeric and sildenafil    Allergies:  No Known Allergies    Objective    Objective     Vitals:   Heart Rate:  [81] 81  Resp:  [16] 16  BP: (149)/(101) 149/101    Physical Exam  Constitutional:       Appearance: Normal appearance. He is well-developed. He is not toxic-appearing.   Eyes:      General: No scleral icterus.  Pulmonary:      Effort: Pulmonary effort is normal. No respiratory distress.   Abdominal:      Palpations: Abdomen is soft.      Tenderness: There is no abdominal tenderness.   Skin:     General: Skin is warm and dry.    Neurological:      Mental Status: He is alert and oriented to person, place, and time.   Psychiatric:         Behavior: Behavior normal.         Thought Content: Thought content normal.         Judgment: Judgment normal.           Assessment & Plan   Assessment / Plan     Brief Patient Summary:  Jeferson Douglas is a 52 y.o. male who is in need of a screening colonoscopy.    Active Hospital Problems:  Active Hospital Problems    Diagnosis    • **Screen for colon cancer      Plan: Colonoscopy.  The patient understands the indications, alternatives, risks, and benefits of the procedure and wishes to proceed.      Jacob Mackenzie Jr., MD

## 2023-03-27 PROBLEM — Z12.11 SCREEN FOR COLON CANCER: Status: RESOLVED | Noted: 2023-02-08 | Resolved: 2023-03-27

## 2023-03-27 PROBLEM — Z86.0100 HISTORY OF COLON POLYPS: Status: ACTIVE | Noted: 2023-03-27

## 2023-03-27 PROBLEM — Z86.010 HISTORY OF COLON POLYPS: Status: ACTIVE | Noted: 2023-03-27

## 2023-03-27 PROBLEM — Q79.2 EXOMPHALOS: Status: ACTIVE | Noted: 2022-05-27

## 2023-03-27 LAB
LAB AP CASE REPORT: NORMAL
PATH REPORT.FINAL DX SPEC: NORMAL
PATH REPORT.GROSS SPEC: NORMAL

## 2023-03-29 ENCOUNTER — TELEPHONE (OUTPATIENT)
Dept: SURGERY | Facility: CLINIC | Age: 53
End: 2023-03-29
Payer: COMMERCIAL

## 2023-03-29 NOTE — TELEPHONE ENCOUNTER
Called patient to give his pathology results from his colonoscopy. No answer, LVM with his results.  Advised to call back with any questions. Placed in recall for cscope in 5 years.

## 2023-03-29 NOTE — TELEPHONE ENCOUNTER
----- Message from Jacob Mackenzie Jr., MD sent at 3/29/2023  1:07 PM EDT -----  Please contact this patient to inform that the polyps are benign and a repeat colonoscopy is needed in 5 years.  Please place in recall for a colonoscopy in 5 years.  Thanks.

## 2023-09-12 ENCOUNTER — OFFICE VISIT (OUTPATIENT)
Dept: FAMILY MEDICINE CLINIC | Facility: CLINIC | Age: 53
End: 2023-09-12
Payer: COMMERCIAL

## 2023-09-12 VITALS
WEIGHT: 164 LBS | HEIGHT: 69 IN | DIASTOLIC BLOOD PRESSURE: 84 MMHG | HEART RATE: 100 BPM | SYSTOLIC BLOOD PRESSURE: 144 MMHG | OXYGEN SATURATION: 97 % | BODY MASS INDEX: 24.29 KG/M2 | TEMPERATURE: 98.4 F

## 2023-09-12 DIAGNOSIS — M25.561 ACUTE PAIN OF RIGHT KNEE: Primary | ICD-10-CM

## 2023-09-12 DIAGNOSIS — M43.13 SPONDYLOLISTHESIS OF CERVICOTHORACIC REGION: ICD-10-CM

## 2023-09-12 DIAGNOSIS — E78.2 MIXED HYPERLIPIDEMIA: ICD-10-CM

## 2023-09-12 DIAGNOSIS — R03.0 ELEVATED BP WITHOUT DIAGNOSIS OF HYPERTENSION: ICD-10-CM

## 2023-09-12 DIAGNOSIS — Z76.89 ENCOUNTER TO ESTABLISH CARE: ICD-10-CM

## 2023-09-12 PROBLEM — G89.29 CHRONIC PAIN OF RIGHT KNEE: Status: ACTIVE | Noted: 2023-09-12

## 2023-09-12 PROCEDURE — 99214 OFFICE O/P EST MOD 30 MIN: CPT | Performed by: NURSE PRACTITIONER

## 2023-09-12 NOTE — ASSESSMENT & PLAN NOTE
Severe arthritis cervical area  Was following chiropracter   Stable at this time is taking tumeric

## 2023-09-12 NOTE — PROGRESS NOTES
"Chief Complaint  Establish Care and Knee Pain (Right knee-nagging pain-one month)    Subjective        Jeferson Douglas presents to [facility]  History of Present Illness  Patient presents the office today to establish care.  He reports right knee pain has been present for greater than 1 month.  He denies any known injury.  He is taking turmeric daily.  I like to check a knee x-ray.  Blood pressure today is 144/84.  Blood pressure is elevated without diagnosis of hypertension.  He works on healthy diet and exercise.  With neck pain chronic stable today.      Objective   Vital Signs:  /84 (BP Location: Left arm, Patient Position: Sitting, Cuff Size: Large Adult)   Pulse 100   Temp 98.4 °F (36.9 °C)   Ht 175.3 cm (69.02\")   Wt 74.4 kg (164 lb)   SpO2 97%   BMI 24.21 kg/m²   Estimated body mass index is 24.21 kg/m² as calculated from the following:    Height as of this encounter: 175.3 cm (69.02\").    Weight as of this encounter: 74.4 kg (164 lb).       BMI is within normal parameters. No other follow-up for BMI required.      Physical Exam  Constitutional:       General: He is not in acute distress.     Appearance: Normal appearance.   HENT:      Head: Normocephalic.   Eyes:      Pupils: Pupils are equal, round, and reactive to light.   Cardiovascular:      Rate and Rhythm: Normal rate.      Pulses: Normal pulses.      Heart sounds: Normal heart sounds.   Pulmonary:      Effort: Pulmonary effort is normal.      Breath sounds: Normal breath sounds.   Musculoskeletal:         General: Tenderness present. Normal range of motion.      Cervical back: Normal range of motion and neck supple.      Right knee: No swelling. Decreased range of motion. Tenderness present.      Left knee: Normal.   Skin:     General: Skin is warm.   Neurological:      General: No focal deficit present.      Mental Status: He is alert and oriented to person, place, and time.   Psychiatric:         Mood and " Affect: Mood normal.         Behavior: Behavior normal.         Thought Content: Thought content normal.         Judgment: Judgment normal.      Result Review :  The following data was reviewed by: VAN Ruiz on 09/12/2023:  Common labs          12/7/2022    07:59   Common Labs   Glucose 105    BUN 15    Creatinine 1.02    Sodium 140    Potassium 4.6    Chloride 100    Calcium 9.3    Total Protein 6.4    Albumin 4.3    Total Bilirubin 1.1    Alkaline Phosphatase 50    AST (SGOT) 16    ALT (SGPT) 17    WBC 5.6    Hemoglobin 16.0    Hematocrit 46.9    Platelets 283    Total Cholesterol 222    Triglycerides 161    HDL Cholesterol 71    LDL Cholesterol  123    PSA 1.0      Data reviewed : Radiologic studies CT CERVICAL SPINE             Assessment and Plan   Diagnoses and all orders for this visit:    1. Acute pain of right knee (Primary)  -     XR Knee 3 View Right; Future    2. Encounter to establish care    3. Mixed hyperlipidemia    4. Spondylolisthesis of cervicothoracic region  Assessment & Plan:  Severe arthritis cervical area  Was following chiropracter   Stable at this time is taking tumeric       5. Elevated BP without diagnosis of hypertension    Acute pain of right knee check x-ray stable    Hyperlipidemia stable working on healthy diet and exercise.    Cervical spondylosis stable at this time taking turmeric.    Elevated blood pressure without diagnosis of hypertension keep blood pressure log and return to office with readings and blood pressure cuff.  DASH diet discussed patient         I spent 30 minutes caring for Jeferson on this date of service. This time includes time spent by me in the following activities:preparing for the visit, reviewing tests, obtaining and/or reviewing a separately obtained history, performing a medically appropriate examination and/or evaluation , counseling and educating the patient/family/caregiver, ordering medications, tests, or procedures, referring and  communicating with other health care professionals , documenting information in the medical record, independently interpreting results and communicating that information with the patient/family/caregiver, and care coordination  Follow Up   Return in about 4 months (around 1/8/2024) for Annual physical.  Patient was given instructions and counseling regarding his condition or for health maintenance advice. Please see specific information pulled into the AVS if appropriate.

## 2023-09-13 ENCOUNTER — HOSPITAL ENCOUNTER (OUTPATIENT)
Dept: GENERAL RADIOLOGY | Facility: HOSPITAL | Age: 53
Discharge: HOME | End: 2023-09-13
Admitting: NURSE PRACTITIONER
Payer: COMMERCIAL

## 2023-09-13 DIAGNOSIS — M25.561 ACUTE PAIN OF RIGHT KNEE: ICD-10-CM

## 2023-09-13 PROCEDURE — 73562 X-RAY EXAM OF KNEE 3: CPT

## 2023-09-14 ENCOUNTER — TELEPHONE (OUTPATIENT)
Dept: FAMILY MEDICINE CLINIC | Facility: CLINIC | Age: 53
End: 2023-09-14
Payer: COMMERCIAL

## 2023-09-14 NOTE — TELEPHONE ENCOUNTER
Caller: Jeferson Douglas    Relationship: Self    Best call back number: [phone]    Caller requesting test results: PATIENT    What test was performed: XRAY    When was the test performed: YESTERDAY 9/13/23      Additional notes: HE WOULD LIKE TO GO OVER THE RESULTS PLEASE

## 2023-09-14 NOTE — TELEPHONE ENCOUNTER
Patient was informed will have to show APRN in office tomorrow cause Yajaira is out and call him back and he said this -When he is using it causes pain and swelling opposite side pain ( golfing,bending/climbing /twisting on feet 8-12 hours makes worse he is a )

## 2023-09-15 DIAGNOSIS — M25.561 ACUTE PAIN OF RIGHT KNEE: Primary | ICD-10-CM

## 2023-09-15 NOTE — TELEPHONE ENCOUNTER
Patient informed re: referral and recommendations to avoid excessive bending/lifting or weightbearing on the affected knee until clearance from ortho use soft knee brace. Urgent care if symptoms severe over weekend

## 2023-09-20 ENCOUNTER — OFFICE VISIT (OUTPATIENT)
Dept: ORTHOPEDIC SURGERY | Facility: CLINIC | Age: 53
End: 2023-09-20
Payer: COMMERCIAL

## 2023-09-20 VITALS — BODY MASS INDEX: 23.92 KG/M2 | WEIGHT: 161.5 LBS | TEMPERATURE: 98.6 F | HEIGHT: 69 IN

## 2023-09-20 DIAGNOSIS — S86.911A KNEE STRAIN, RIGHT, INITIAL ENCOUNTER: Primary | ICD-10-CM

## 2023-09-22 ENCOUNTER — PATIENT ROUNDING (BHMG ONLY) (OUTPATIENT)
Dept: ORTHOPEDIC SURGERY | Facility: CLINIC | Age: 53
End: 2023-09-22
Payer: COMMERCIAL

## 2023-09-22 NOTE — PROGRESS NOTES
A [employer] Message has been sent to the patient for PATIENT ROUNDING with [facility]

## 2023-09-24 NOTE — PROGRESS NOTES
General Exam    Patient: Jeferson Douglas    YOB: 1970    Medical Record Number: [id number]    Chief Complaints: Right knee pain    History of Present Illness:     52 y.o. male patient who presents for evaluation of right knee pain.  States that several weeks ago when he was playing golf he only does this several times a year but he felt some lateral discomfort and pain after swinging his .  States pain has been better over the past few days.  Denies any popping clicking or catching or mechanical symptoms.  He is able to full weight-bear.    Denies any numbness or tingling.  Denies any fevers, cough or shortness of breath.    Allergies: No Known Allergies    Home Medications:      Current Outpatient Medications:     sildenafil (VIAGRA) 50 MG tablet, Take 1 tablet by mouth Daily As Needed for Erectile Dysfunction., Disp: 30 tablet, Rfl: 0    Turmeric 500 MG capsule, Take  by mouth., Disp: , Rfl:     Past Medical History:   Diagnosis Date    Asthma     Back pain        Past Surgical History:   Procedure Laterality Date    COLONOSCOPY N/A 3/24/2023    Procedure: COLONOSCOPY TO CECUM WITH HOT SNARE POLYPECTOMY, COLD POLYPECTOMY;  Surgeon: Jacob Mackenzie Jr., MD;  Location: [facility] ENDOSCOPY;  Service: General;  Laterality: N/A;  PRE- SCREENING  POST- COLON POLYPS    HERNIA REPAIR         Social History     Occupational History    Not on file   Tobacco Use    Smoking status: Former     Years: 25.00     Types: Cigarettes     Quit date: 2013     Years since quitting: 10.7    Smokeless tobacco: Not on file   Vaping Use    Vaping Use: Never used   Substance and Sexual Activity    Alcohol use: Yes     Comment: weekends    Drug use: No    Sexual activity: Defer      Social History     Social History Narrative    Not on file       Family History   Problem Relation Age of Onset    No Known Problems Mother        Review of Systems:      Constitutional: Denies fever, shaking or chills         All other  "pertinent positives and negatives as noted above in HPI.    Physical Exam: 52 y.o. male    Vitals:    09/20/23 1016   Temp: 98.6 °F (37 °C)   TempSrc: Temporal   Weight: 73.3 kg (161 lb 8 oz)   Height: 175.3 cm (69.02\")       General:  Patient is awake and alert.  Appears in no acute distress or discomfort.      Musculoskeletal/Extremities:    Right lower extremity examined no overt tenderness upon palpation to the medial or lateral joint line.  Full knee range of motion.  Just some slight discomfort at terminal hyperflexion.  Motor and sensory intact distally.  Negative anterior drawer, posterior drawer, Lachman and Catherine's.         Radiology:       Previous knee films reviewed to evaluate the patient's complaint/s.        Assessment: Right knee strain      Plan:      Discussed finds the patient think is more soft tissue injury not particularly in the joint.  Patient symptoms have improved.  Overall exam findings were fairly benign.  At this time recommend continue conservative treatment rest, ice, avoid stress activity may try an anti-inflammatory next few days.  As long symptoms continue improving next 2 to 3 weeks he then may progress back to normal activities.  If they are not he may give us a call and we can discuss either therapy or further evaluation if warranted.           We will plan for follow up as needed.    All questions were answered.  Patient understands and agrees with the plan.    Frederick Jeff MD    09/20/2023    CC to Yajaira Jean APRN        "

## 2024-01-19 ENCOUNTER — OFFICE VISIT (OUTPATIENT)
Dept: FAMILY MEDICINE CLINIC | Facility: CLINIC | Age: 54
End: 2024-01-19
Payer: COMMERCIAL

## 2024-01-19 VITALS
HEIGHT: 69 IN | DIASTOLIC BLOOD PRESSURE: 88 MMHG | OXYGEN SATURATION: 98 % | WEIGHT: 170.2 LBS | SYSTOLIC BLOOD PRESSURE: 134 MMHG | BODY MASS INDEX: 25.21 KG/M2 | HEART RATE: 78 BPM | TEMPERATURE: 98.4 F

## 2024-01-19 DIAGNOSIS — Z87.891 HISTORY OF TOBACCO ABUSE: ICD-10-CM

## 2024-01-19 DIAGNOSIS — E78.2 MIXED HYPERLIPIDEMIA: ICD-10-CM

## 2024-01-19 DIAGNOSIS — N52.8 OTHER MALE ERECTILE DYSFUNCTION: ICD-10-CM

## 2024-01-19 DIAGNOSIS — Z00.00 ANNUAL PHYSICAL EXAM: Primary | ICD-10-CM

## 2024-01-19 DIAGNOSIS — Z12.5 SCREENING PSA (PROSTATE SPECIFIC ANTIGEN): ICD-10-CM

## 2024-01-19 DIAGNOSIS — R05.8 NONPRODUCTIVE COUGH: ICD-10-CM

## 2024-01-19 LAB
ALBUMIN SERPL-MCNC: 4.4 G/DL (ref 3.5–5.2)
ALBUMIN/GLOB SERPL: 2.4 G/DL
ALP SERPL-CCNC: 46 U/L (ref 39–117)
ALT SERPL-CCNC: 19 U/L (ref 1–41)
AST SERPL-CCNC: 19 U/L (ref 1–40)
BASOPHILS # BLD AUTO: 0.02 10*3/MM3 (ref 0–0.2)
BASOPHILS NFR BLD AUTO: 0.5 % (ref 0–1.5)
BILIRUB SERPL-MCNC: 0.9 MG/DL (ref 0–1.2)
BUN SERPL-MCNC: 12 MG/DL (ref 6–20)
BUN/CREAT SERPL: 12.5 (ref 7–25)
CALCIUM SERPL-MCNC: 9 MG/DL (ref 8.6–10.5)
CHLORIDE SERPL-SCNC: 102 MMOL/L (ref 98–107)
CHOLEST SERPL-MCNC: 229 MG/DL (ref 0–200)
CO2 SERPL-SCNC: 27 MMOL/L (ref 22–29)
CREAT SERPL-MCNC: 0.96 MG/DL (ref 0.76–1.27)
EGFRCR SERPLBLD CKD-EPI 2021: 93.9 ML/MIN/1.73
EOSINOPHIL # BLD AUTO: 0.08 10*3/MM3 (ref 0–0.4)
EOSINOPHIL NFR BLD AUTO: 2.1 % (ref 0.3–6.2)
ERYTHROCYTE [DISTWIDTH] IN BLOOD BY AUTOMATED COUNT: 12.8 % (ref 12.3–15.4)
GLOBULIN SER CALC-MCNC: 1.8 GM/DL
GLUCOSE SERPL-MCNC: 109 MG/DL (ref 65–99)
HCT VFR BLD AUTO: 43.5 % (ref 37.5–51)
HDLC SERPL-MCNC: 95 MG/DL (ref 40–60)
HGB BLD-MCNC: 14.9 G/DL (ref 13–17.7)
IMM GRANULOCYTES # BLD AUTO: 0.01 10*3/MM3 (ref 0–0.05)
IMM GRANULOCYTES NFR BLD AUTO: 0.3 % (ref 0–0.5)
LDLC SERPL CALC-MCNC: 122 MG/DL (ref 0–100)
LYMPHOCYTES # BLD AUTO: 1.16 10*3/MM3 (ref 0.7–3.1)
LYMPHOCYTES NFR BLD AUTO: 30.7 % (ref 19.6–45.3)
MCH RBC QN AUTO: 31.4 PG (ref 26.6–33)
MCHC RBC AUTO-ENTMCNC: 34.3 G/DL (ref 31.5–35.7)
MCV RBC AUTO: 91.8 FL (ref 79–97)
MONOCYTES # BLD AUTO: 0.39 10*3/MM3 (ref 0.1–0.9)
MONOCYTES NFR BLD AUTO: 10.3 % (ref 5–12)
NEUTROPHILS # BLD AUTO: 2.12 10*3/MM3 (ref 1.7–7)
NEUTROPHILS NFR BLD AUTO: 56.1 % (ref 42.7–76)
NRBC BLD AUTO-RTO: 0 /100 WBC (ref 0–0.2)
PLATELET # BLD AUTO: 298 10*3/MM3 (ref 140–450)
POTASSIUM SERPL-SCNC: 4.4 MMOL/L (ref 3.5–5.2)
PROT SERPL-MCNC: 6.2 G/DL (ref 6–8.5)
PSA SERPL-MCNC: 1.06 NG/ML (ref 0–4)
RBC # BLD AUTO: 4.74 10*6/MM3 (ref 4.14–5.8)
SODIUM SERPL-SCNC: 139 MMOL/L (ref 136–145)
TRIGL SERPL-MCNC: 68 MG/DL (ref 0–150)
TSH SERPL DL<=0.005 MIU/L-ACNC: 1.73 UIU/ML (ref 0.27–4.2)
VLDLC SERPL CALC-MCNC: 12 MG/DL (ref 5–40)
WBC # BLD AUTO: 3.78 10*3/MM3 (ref 3.4–10.8)

## 2024-01-19 NOTE — PROGRESS NOTES
"Chief Complaint  Annual Exam (Coffee with sugar/)    Subjective        Jeferson Douglas presents to [facility]  History of Present Illness  Patient presents the office today for annual physical exam.  Blood pressure is 134/88.  With hyperlipidemia patient monitors healthy diet and exercise.  He has recently started going back to the gym and feels improvement.  With ED stable taking sildenafil.  Will call back later today to notify pharmacy that he is requesting this medication to be sent to.  He denies chest pain shortness of air.  Patient has a nonproductive cough.  Patient has history of tobacco abuse quit some years ago.  Smoked from 20 years.  Does not have a chest x-ray on file we will place order and he will complete within 1 month.      Objective   Vital Signs:  /88   Pulse 78   Temp 98.4 °F (36.9 °C)   Ht 175.3 cm (69.02\")   Wt 77.2 kg (170 lb 3.2 oz)   SpO2 98%   BMI 25.12 kg/m²   Estimated body mass index is 25.12 kg/m² as calculated from the following:    Height as of this encounter: 175.3 cm (69.02\").    Weight as of this encounter: 77.2 kg (170 lb 3.2 oz).       BMI is >= 25 and <30. (Overweight) The following options were offered after discussion;: weight loss educational material (shared in after visit summary) and exercise counseling/recommendations      Physical Exam  Constitutional:       General: He is not in acute distress.     Appearance: Normal appearance.   HENT:      Head: Normocephalic.      Right Ear: Tympanic membrane, ear canal and external ear normal.      Left Ear: Tympanic membrane, ear canal and external ear normal.      Nose: Nose normal.   Eyes:      Pupils: Pupils are equal, round, and reactive to light.   Cardiovascular:      Rate and Rhythm: Normal rate.      Pulses: Normal pulses.      Heart sounds: Normal heart sounds.   Pulmonary:      Effort: Pulmonary effort is normal.      Breath sounds: Normal breath sounds.   Abdominal:      " General: Bowel sounds are normal.      Palpations: Abdomen is soft.   Musculoskeletal:         General: Normal range of motion.      Cervical back: Normal range of motion and neck supple.   Skin:     General: Skin is warm.   Neurological:      General: No focal deficit present.      Mental Status: He is alert and oriented to person, place, and time.   Psychiatric:         Mood and Affect: Mood normal.         Behavior: Behavior normal.         Thought Content: Thought content normal.         Judgment: Judgment normal.        Result Review :    The following data was reviewed by: VAN Ruiz on 01/19/2024:    Data reviewed : Radiologic studies knee xray right              Assessment and Plan     Diagnoses and all orders for this visit:    1. Annual physical exam (Primary)  -     CBC & Differential  -     Comprehensive Metabolic Panel  -     Lipid Panel  -     TSH  -     PSA Screen    2. Mixed hyperlipidemia  -     Lipid Panel    3. Screening PSA (prostate specific antigen)  -     PSA Screen    4. Nonproductive cough  -     XR Chest PA & Lateral; Future    5. Other male erectile dysfunction  Assessment & Plan:  With the ED stable taking sildenafil.      6. History of tobacco abuse      Counseling was provided on nutrition, physical activity, development, and injury prevention, dental health, and safe sex practices patient verbalizes understanding no additional questions were asked.       I spent 30 minutes caring for Jeferson on this date of service. This time includes time spent by me in the following activities:preparing for the visit, reviewing tests, obtaining and/or reviewing a separately obtained history, performing a medically appropriate examination and/or evaluation , counseling and educating the patient/family/caregiver, ordering medications, tests, or procedures, documenting information in the medical record, independently interpreting results and communicating that information with the  patient/family/caregiver, and care coordination  Follow Up     Return in about 1 year (around 1/19/2025), or if symptoms worsen or fail to improve.  Patient was given instructions and counseling regarding his condition or for health maintenance advice. Please see specific information pulled into the AVS if appropriate.

## 2024-02-21 DIAGNOSIS — N52.9 ERECTILE DYSFUNCTION, UNSPECIFIED ERECTILE DYSFUNCTION TYPE: ICD-10-CM

## 2024-02-21 RX ORDER — SILDENAFIL 50 MG/1
50 TABLET, FILM COATED ORAL DAILY PRN
Qty: 30 TABLET | Refills: 0 | Status: SHIPPED | OUTPATIENT
Start: 2024-02-21

## 2024-02-21 NOTE — TELEPHONE ENCOUNTER
Caller: Jeferson Douglas    Relationship: Self    Best call back number: [phone]    Requested Prescriptions:   Requested Prescriptions     Pending Prescriptions Disp Refills    sildenafil (VIAGRA) 50 MG tablet 30 tablet 0     Sig: Take 1 tablet by mouth Daily As Needed for Erectile Dysfunction.        Pharmacy where request should be sent: [facility] #[id number] - [place], [place] - [address] AT [facility] [phone] [facility] [phone] FX     Last office visit with prescribing clinician: 1/19/2024   Last telemedicine visit with prescribing clinician: Visit date not found   Next office visit with prescribing clinician: 1/24/2025     Does the patient have less than a 3 day supply:  [x] Yes  [] No    Would you like a call back once the refill request has been completed: [x] Yes [] No    If the office needs to give you a call back, can they leave a voicemail: [x] Yes [] No    Jeremie Del Toro   02/21/24 09:09 EST

## 2024-02-27 ENCOUNTER — HOSPITAL ENCOUNTER (OUTPATIENT)
Dept: GENERAL RADIOLOGY | Facility: HOSPITAL | Age: 54
Discharge: HOME | End: 2024-02-27
Admitting: NURSE PRACTITIONER
Payer: COMMERCIAL

## 2024-02-27 DIAGNOSIS — R05.8 NONPRODUCTIVE COUGH: ICD-10-CM

## 2024-02-27 PROCEDURE — 71046 X-RAY EXAM CHEST 2 VIEWS: CPT

## 2025-01-24 ENCOUNTER — OFFICE VISIT (OUTPATIENT)
Dept: FAMILY MEDICINE CLINIC | Facility: CLINIC | Age: 55
End: 2025-01-24
Payer: COMMERCIAL

## 2025-01-24 VITALS
WEIGHT: 164.8 LBS | BODY MASS INDEX: 23.59 KG/M2 | HEART RATE: 78 BPM | DIASTOLIC BLOOD PRESSURE: 90 MMHG | OXYGEN SATURATION: 100 % | SYSTOLIC BLOOD PRESSURE: 160 MMHG | HEIGHT: 70 IN | TEMPERATURE: 98.6 F

## 2025-01-24 DIAGNOSIS — Z12.5 SCREENING PSA (PROSTATE SPECIFIC ANTIGEN): ICD-10-CM

## 2025-01-24 DIAGNOSIS — E78.2 MIXED HYPERLIPIDEMIA: ICD-10-CM

## 2025-01-24 DIAGNOSIS — Z00.00 ANNUAL PHYSICAL EXAM: Primary | ICD-10-CM

## 2025-01-24 DIAGNOSIS — R03.0 ELEVATED BP WITHOUT DIAGNOSIS OF HYPERTENSION: ICD-10-CM

## 2025-01-24 LAB
ALBUMIN SERPL-MCNC: 4.5 G/DL (ref 3.5–5.2)
ALBUMIN/GLOB SERPL: 2.1 G/DL
ALP SERPL-CCNC: 48 U/L (ref 39–117)
ALT SERPL-CCNC: 15 U/L (ref 1–41)
AST SERPL-CCNC: 17 U/L (ref 1–40)
BASOPHILS # BLD AUTO: 0.03 10*3/MM3 (ref 0–0.2)
BASOPHILS NFR BLD AUTO: 0.6 % (ref 0–1.5)
BILIRUB SERPL-MCNC: 1.1 MG/DL (ref 0–1.2)
BUN SERPL-MCNC: 14 MG/DL (ref 6–20)
BUN/CREAT SERPL: 14.1 (ref 7–25)
CALCIUM SERPL-MCNC: 9.9 MG/DL (ref 8.6–10.5)
CHLORIDE SERPL-SCNC: 96 MMOL/L (ref 98–107)
CHOLEST SERPL-MCNC: 211 MG/DL (ref 0–200)
CO2 SERPL-SCNC: 27.3 MMOL/L (ref 22–29)
CREAT SERPL-MCNC: 0.99 MG/DL (ref 0.76–1.27)
EGFRCR SERPLBLD CKD-EPI 2021: 90 ML/MIN/1.73
EOSINOPHIL # BLD AUTO: 0.1 10*3/MM3 (ref 0–0.4)
EOSINOPHIL NFR BLD AUTO: 2.2 % (ref 0.3–6.2)
ERYTHROCYTE [DISTWIDTH] IN BLOOD BY AUTOMATED COUNT: 12.7 % (ref 12.3–15.4)
GLOBULIN SER CALC-MCNC: 2.1 GM/DL
GLUCOSE SERPL-MCNC: 104 MG/DL (ref 65–99)
HCT VFR BLD AUTO: 48.4 % (ref 37.5–51)
HDLC SERPL-MCNC: 92 MG/DL (ref 40–60)
HGB BLD-MCNC: 15.6 G/DL (ref 13–17.7)
IMM GRANULOCYTES # BLD AUTO: 0.01 10*3/MM3 (ref 0–0.05)
IMM GRANULOCYTES NFR BLD AUTO: 0.2 % (ref 0–0.5)
LDLC SERPL CALC-MCNC: 107 MG/DL (ref 0–100)
LYMPHOCYTES # BLD AUTO: 1.39 10*3/MM3 (ref 0.7–3.1)
LYMPHOCYTES NFR BLD AUTO: 30.1 % (ref 19.6–45.3)
MCH RBC QN AUTO: 30.7 PG (ref 26.6–33)
MCHC RBC AUTO-ENTMCNC: 32.2 G/DL (ref 31.5–35.7)
MCV RBC AUTO: 95.3 FL (ref 79–97)
MONOCYTES # BLD AUTO: 0.49 10*3/MM3 (ref 0.1–0.9)
MONOCYTES NFR BLD AUTO: 10.6 % (ref 5–12)
NEUTROPHILS # BLD AUTO: 2.6 10*3/MM3 (ref 1.7–7)
NEUTROPHILS NFR BLD AUTO: 56.3 % (ref 42.7–76)
NRBC BLD AUTO-RTO: 0 /100 WBC (ref 0–0.2)
PLATELET # BLD AUTO: 331 10*3/MM3 (ref 140–450)
POTASSIUM SERPL-SCNC: 5.6 MMOL/L (ref 3.5–5.2)
PROT SERPL-MCNC: 6.6 G/DL (ref 6–8.5)
PSA SERPL-MCNC: 1.02 NG/ML (ref 0–4)
RBC # BLD AUTO: 5.08 10*6/MM3 (ref 4.14–5.8)
SODIUM SERPL-SCNC: 141 MMOL/L (ref 136–145)
TRIGL SERPL-MCNC: 67 MG/DL (ref 0–150)
TSH SERPL DL<=0.005 MIU/L-ACNC: 1.71 UIU/ML (ref 0.27–4.2)
VLDLC SERPL CALC-MCNC: 12 MG/DL (ref 5–40)
WBC # BLD AUTO: 4.62 10*3/MM3 (ref 3.4–10.8)

## 2025-01-24 NOTE — PROGRESS NOTES
"Chief Complaint  Annual Exam (Fasting/Due PSA)    Subjective        Jeferson Douglas presents to [facility]  History of Present Illness  Patient presents office today for annual physical exam.  Patient's blood pressure reading is at 170/106.  Patient denies chest pain, shortness of air, headache, vision changes.  He is negative for flushed face.  He denies racing heartbeat or palpitations.  Repeat blood pressure 160/90.  Patient does not take any daily medication.  Patient is without family history of heart attack, stroke.  With hyperlipidemia patient is monitoring diet while going to the gym and exercise.            The 10-year ASCVD risk score (Leandro SANCHEZ, et al., 2019) is: 6.1%    Values used to calculate the score:      Age: 54 years      Sex: Male      Is Non- : No      Diabetic: No      Tobacco smoker: No      Systolic Blood Pressure: 170 mmHg      Is BP treated: No      HDL Cholesterol: 95 mg/dL      Total Cholesterol: 229 mg/dL       Objective   Vital Signs:  /90   Pulse 78   Temp 98.6 °F (37 °C)   Ht 177.8 cm (70\")   Wt 74.8 kg (164 lb 12.8 oz)   SpO2 100%   BMI 23.65 kg/m²   Estimated body mass index is 23.65 kg/m² as calculated from the following:    Height as of this encounter: 177.8 cm (70\").    Weight as of this encounter: 74.8 kg (164 lb 12.8 oz).    BMI is within normal parameters. No other follow-up for BMI required.      Physical Exam  Constitutional:       General: He is not in acute distress.     Appearance: Normal appearance.   HENT:      Head: Normocephalic.      Right Ear: Tympanic membrane, ear canal and external ear normal.      Left Ear: Tympanic membrane, ear canal and external ear normal.      Nose: Nose normal.   Eyes:      Pupils: Pupils are equal, round, and reactive to light.   Cardiovascular:      Rate and Rhythm: Normal rate.      Pulses: Normal pulses.      Heart sounds: Normal heart sounds.   Pulmonary:      Effort: " Pulmonary effort is normal.      Breath sounds: Normal breath sounds.   Abdominal:      General: Bowel sounds are normal.      Palpations: Abdomen is soft.      Tenderness: There is no abdominal tenderness.   Musculoskeletal:         General: Normal range of motion.      Cervical back: Normal range of motion and neck supple.   Skin:     General: Skin is warm.   Neurological:      General: No focal deficit present.      Mental Status: He is alert and oriented to person, place, and time.   Psychiatric:         Mood and Affect: Mood normal.         Behavior: Behavior normal.         Thought Content: Thought content normal.         Judgment: Judgment normal.        Result Review :  The following data was reviewed by: VAN Ruiz on 01/24/2025:    Data reviewed : Radiologic studies chest xray            Assessment and Plan   Diagnoses and all orders for this visit:    1. Annual physical exam (Primary)  -     CBC & Differential  -     Comprehensive Metabolic Panel  -     Lipid Panel  -     TSH    2. Screening PSA (prostate specific antigen)  -     PSA SCREENING    3. Mixed hyperlipidemia  -     Lipid Panel    4. Elevated BP without diagnosis of hypertension  Assessment & Plan:  Patient to keep a blood pressure log 2-3 times daily.  Follow-up with me in 2 to 3 weeks.  Deferred EKG.  Denies chest pain shortness of air.  Healthy heart diet including DASH diet discussed with patient.    Orders:  -     Comprehensive Metabolic Panel    Deferred EKG at this time.  Patient to follow-up in 2 to 3 weeks.    Counseling was provided on nutrition, physical activity, development, and injury prevention, dental health, and safe sex practices patient verbalizes understanding no additional questions were asked.         I spent 30 minutes caring for Jeferson on this date of service. This time includes time spent by me in the following activities:preparing for the visit, reviewing tests, obtaining and/or reviewing a separately  obtained history, performing a medically appropriate examination and/or evaluation , counseling and educating the patient/family/caregiver, ordering medications, tests, or procedures, documenting information in the medical record, independently interpreting results and communicating that information with the patient/family/caregiver, and care coordination  Follow Up   Return in about 3 weeks (around 2/14/2025) for Recheck.  Patient was given instructions and counseling regarding his condition or for health maintenance advice. Please see specific information pulled into the AVS if appropriate.

## 2025-01-24 NOTE — ASSESSMENT & PLAN NOTE
Patient to keep a blood pressure log 2-3 times daily.  Follow-up with me in 2 to 3 weeks.  Deferred EKG.  Denies chest pain shortness of air.  Healthy heart diet including DASH diet discussed with patient.

## 2025-01-27 DIAGNOSIS — E87.5 HYPERKALEMIA: Primary | ICD-10-CM

## 2025-01-27 DIAGNOSIS — R73.9 HYPERGLYCEMIA: ICD-10-CM

## 2025-01-27 LAB
HBA1C MFR BLD: 5.4 % (ref 4.8–5.6)
WRITTEN AUTHORIZATION: NORMAL

## 2025-03-17 ENCOUNTER — OFFICE VISIT (OUTPATIENT)
Dept: FAMILY MEDICINE CLINIC | Facility: CLINIC | Age: 55
End: 2025-03-17
Payer: COMMERCIAL

## 2025-03-17 VITALS
DIASTOLIC BLOOD PRESSURE: 92 MMHG | BODY MASS INDEX: 23.08 KG/M2 | WEIGHT: 161.2 LBS | HEART RATE: 89 BPM | TEMPERATURE: 98.4 F | HEIGHT: 70 IN | SYSTOLIC BLOOD PRESSURE: 152 MMHG | OXYGEN SATURATION: 100 %

## 2025-03-17 DIAGNOSIS — F41.9 ANXIETY: ICD-10-CM

## 2025-03-17 DIAGNOSIS — I10 PRIMARY HYPERTENSION: Primary | ICD-10-CM

## 2025-03-17 PROBLEM — R03.0 ELEVATED BP WITHOUT DIAGNOSIS OF HYPERTENSION: Status: RESOLVED | Noted: 2023-09-12 | Resolved: 2025-03-17

## 2025-03-17 PROCEDURE — 99214 OFFICE O/P EST MOD 30 MIN: CPT | Performed by: NURSE PRACTITIONER

## 2025-03-17 RX ORDER — ATENOLOL 25 MG/1
25 TABLET ORAL DAILY
Qty: 30 TABLET | Refills: 0 | Status: SHIPPED | OUTPATIENT
Start: 2025-03-17

## 2025-03-17 NOTE — ASSESSMENT & PLAN NOTE
Hypertension is uncontrolled  Dietary sodium restriction.  Weight loss.  Regular aerobic exercise.  Stop smoking.  Ambulatory blood pressure monitoring.  Blood pressure will be reassessedin 2 weeks.  Start atenolol.

## 2025-03-17 NOTE — PROGRESS NOTES
"Chief Complaint  Elevated BP without diagnosis of hypertension (Follow up/) and Cough (Sore throat over the weekend )    Subjective        Jeferson Douglas presents to [facility]  History of Present Illness  Patient presents office today for a follow-up on elevated blood pressure.  Patient has had multiple elevated blood pressure readings.  He denies chest pain shortness of air.  Patient checks blood pressure at home with elevated readings.  At this time diagnosing patient with hypertension and starting daily blood pressure medication.  Patient reports having alcohol abuse and typically is drinking daily.  Patient reports the need and want to stop drinking alcohol.  Blood pressure today is 152/92.    Patient reports having anxiety.  He denies depression.  Heart rate today is 89 oxygen 100%.                  Objective   Vital Signs:  /92 (BP Location: Left arm, Patient Position: Sitting, Cuff Size: Adult)   Pulse 89   Temp 98.4 °F (36.9 °C)   Ht 177.8 cm (70\")   Wt 73.1 kg (161 lb 3.2 oz)   SpO2 100%   BMI 23.13 kg/m²   Estimated body mass index is 23.13 kg/m² as calculated from the following:    Height as of this encounter: 177.8 cm (70\").    Weight as of this encounter: 73.1 kg (161 lb 3.2 oz).    BMI is within normal parameters. No other follow-up for BMI required.      Physical Exam  Constitutional:       General: He is not in acute distress.     Appearance: Normal appearance.   HENT:      Head: Normocephalic.   Eyes:      Pupils: Pupils are equal, round, and reactive to light.   Cardiovascular:      Rate and Rhythm: Normal rate.      Pulses: Normal pulses.      Heart sounds: Normal heart sounds.   Pulmonary:      Effort: Pulmonary effort is normal.      Breath sounds: Normal breath sounds.   Musculoskeletal:         General: Normal range of motion.      Cervical back: Normal range of motion and neck supple.   Skin:     General: Skin is warm.   Neurological:      General: " No focal deficit present.      Mental Status: He is alert and oriented to person, place, and time.   Psychiatric:         Mood and Affect: Mood is anxious. Mood is not depressed.         Speech: Speech normal.         Behavior: Behavior normal.         Thought Content: Thought content normal. Thought content is not paranoid or delusional. Thought content does not include homicidal or suicidal ideation. Thought content does not include homicidal or suicidal plan.         Judgment: Judgment normal.        Result Review :                Assessment and Plan   Diagnoses and all orders for this visit:    1. Primary hypertension (Primary)  Assessment & Plan:  Hypertension is uncontrolled  Dietary sodium restriction.  Weight loss.  Regular aerobic exercise.  Stop smoking.  Ambulatory blood pressure monitoring.  Blood pressure will be reassessedin 2 weeks.  Start atenolol.    Orders:  -     atenolol (Tenormin) 25 MG tablet; Take 1 tablet by mouth Daily.  Dispense: 30 tablet; Refill: 0    2. Anxiety    Anxiety patient to work on avoiding stressful situations, denies depression.  Denies active palpitations or chest pain.    Side effects of all new and old medications reviewed with the patient -willing to accept all risks involved.  Advised to rto if no improvement or worsening of symptoms.  Patient instructed to  clinical summary at .            Follow Up   Return in about 2 weeks (around 3/31/2025) for Recheck.  Patient was given instructions and counseling regarding his condition or for health maintenance advice. Please see specific information pulled into the AVS if appropriate.

## 2025-04-04 ENCOUNTER — OFFICE VISIT (OUTPATIENT)
Dept: FAMILY MEDICINE CLINIC | Facility: CLINIC | Age: 55
End: 2025-04-04
Payer: COMMERCIAL

## 2025-04-04 VITALS
BODY MASS INDEX: 23.51 KG/M2 | HEART RATE: 71 BPM | OXYGEN SATURATION: 100 % | DIASTOLIC BLOOD PRESSURE: 84 MMHG | HEIGHT: 70 IN | TEMPERATURE: 98.4 F | SYSTOLIC BLOOD PRESSURE: 130 MMHG | WEIGHT: 164.2 LBS

## 2025-04-04 DIAGNOSIS — E78.2 MIXED HYPERLIPIDEMIA: ICD-10-CM

## 2025-04-04 DIAGNOSIS — I10 PRIMARY HYPERTENSION: Primary | ICD-10-CM

## 2025-04-04 PROCEDURE — 99214 OFFICE O/P EST MOD 30 MIN: CPT | Performed by: NURSE PRACTITIONER

## 2025-04-04 RX ORDER — ATENOLOL 25 MG/1
25 TABLET ORAL DAILY
Qty: 90 TABLET | Refills: 1 | Status: SHIPPED | OUTPATIENT
Start: 2025-04-04

## 2025-04-04 NOTE — ASSESSMENT & PLAN NOTE
Lipid abnormalities are stable    Plan:  Working on healthy diet and exercise.      Counseled patient on lifestyle modifications to help control hyperlipidemia.     Patient Treatment Goals:   LDL goal is less than 70    Followup in 6 months.

## 2025-04-04 NOTE — PROGRESS NOTES
"Chief Complaint  Hypertension (Follow up/)    Subjective        Jeferson Douglas presents to [facility]  History of Present Illness  Patient presents to the office today for a follow up on hypertension. Patient brought blood pressure log to office with all normal readings.   With hypertension continue atenolol as directed.  Working on healthy diet and exercise.            Objective   Vital Signs:  /84 (BP Location: Left arm, Patient Position: Sitting, Cuff Size: Adult)   Pulse 71   Temp 98.4 °F (36.9 °C)   Ht 177.8 cm (70\")   Wt 74.5 kg (164 lb 3.2 oz)   SpO2 100%   BMI 23.56 kg/m²   Estimated body mass index is 23.56 kg/m² as calculated from the following:    Height as of this encounter: 177.8 cm (70\").    Weight as of this encounter: 74.5 kg (164 lb 3.2 oz).    BMI is within normal parameters. No other follow-up for BMI required.      Physical Exam  Constitutional:       General: He is not in acute distress.     Appearance: Normal appearance.   HENT:      Head: Normocephalic.   Eyes:      Pupils: Pupils are equal, round, and reactive to light.   Cardiovascular:      Rate and Rhythm: Normal rate.      Pulses: Normal pulses.      Heart sounds: Normal heart sounds.   Pulmonary:      Effort: Pulmonary effort is normal.      Breath sounds: Normal breath sounds.   Musculoskeletal:         General: Normal range of motion.      Cervical back: Normal range of motion and neck supple.   Skin:     General: Skin is warm.   Neurological:      General: No focal deficit present.      Mental Status: He is alert and oriented to person, place, and time.   Psychiatric:         Mood and Affect: Mood normal.         Behavior: Behavior normal.         Thought Content: Thought content normal.         Judgment: Judgment normal.        Result Review :  The following data was reviewed by: VAN Ruiz on 04/04/2025:  Common labs          1/24/2025    08:43 1/31/2025    09:50   Common Labs "   Glucose 104  100    BUN 14  16    Creatinine 0.99  0.95    Sodium 141  140    Potassium 5.6  4.5    Chloride 96  101    Calcium 9.9  9.5    Albumin 4.5  4.3    Total Bilirubin 1.1  0.7    Alkaline Phosphatase 48  48    AST (SGOT) 17  19    ALT (SGPT) 15  18    WBC 4.62     Hemoglobin 15.6     Hematocrit 48.4     Platelets 331     Total Cholesterol 211     Triglycerides 67     HDL Cholesterol 92     LDL Cholesterol  107     Hemoglobin A1C 5.40     PSA 1.020                 Assessment and Plan   Diagnoses and all orders for this visit:    1. Primary hypertension (Primary)  Assessment & Plan:  Hypertension is stable and controlled  Continue current treatment regimen.  Dietary sodium restriction.  Weight loss.  Regular aerobic exercise.  Blood pressure will be reassessed in 6 months.    Orders:  -     atenolol (Tenormin) 25 MG tablet; Take 1 tablet by mouth Daily.  Dispense: 90 tablet; Refill: 1    2. Mixed hyperlipidemia  Assessment & Plan:   Lipid abnormalities are stable    Plan:  Working on healthy diet and exercise.      Counseled patient on lifestyle modifications to help control hyperlipidemia.     Patient Treatment Goals:   LDL goal is less than 70    Followup in 6 months.        Side effects of all new and old medications reviewed with the patient -willing to accept all risks involved.  Advised to rto if no improvement or worsening of symptoms.  Patient instructed to  clinical summary at .        I spent 15 minutes caring for Jeferson on this date of service. This time includes time spent by me in the following activities:preparing for the visit, reviewing tests, obtaining and/or reviewing a separately obtained history, performing a medically appropriate examination and/or evaluation , counseling and educating the patient/family/caregiver, ordering medications, tests, or procedures, documenting information in the medical record, independently interpreting results and communicating that  information with the patient/family/caregiver, and care coordination  Follow Up   Return in about 6 months (around 10/13/2025) for Recheck.  Patient was given instructions and counseling regarding his condition or for health maintenance advice. Please see specific information pulled into the AVS if appropriate.

## (undated) DEVICE — KT ORCA ORCAPOD DISP STRL

## (undated) DEVICE — LN SMPL CO2 SHTRM SD STREAM W/M LUER

## (undated) DEVICE — CANN O2 ETCO2 FITS ALL CONN CO2 SMPL A/ 7IN DISP LF

## (undated) DEVICE — SNAR POLYP SENSATION STDOVL 27 240 BX40

## (undated) DEVICE — THE SINGLE USE ETRAP – POLYP TRAP IS USED FOR SUCTION RETRIEVAL OF ENDOSCOPICALLY REMOVED POLYPS.: Brand: ETRAP

## (undated) DEVICE — TUBING, SUCTION, 1/4" X 10', STRAIGHT: Brand: MEDLINE

## (undated) DEVICE — ADAPT CLN BIOGUARD AIR/H2O DISP

## (undated) DEVICE — SENSR O2 OXIMAX FNGR A/ 18IN NONSTR